# Patient Record
Sex: FEMALE | Race: WHITE
[De-identification: names, ages, dates, MRNs, and addresses within clinical notes are randomized per-mention and may not be internally consistent; named-entity substitution may affect disease eponyms.]

---

## 2017-04-15 ENCOUNTER — HOSPITAL ENCOUNTER (EMERGENCY)
Dept: HOSPITAL 11 - JP.ED | Age: 47
Discharge: HOME | End: 2017-04-15
Payer: MEDICAID

## 2017-04-15 VITALS — DIASTOLIC BLOOD PRESSURE: 80 MMHG | SYSTOLIC BLOOD PRESSURE: 128 MMHG

## 2017-04-15 DIAGNOSIS — N39.0: Primary | ICD-10-CM

## 2017-04-15 DIAGNOSIS — Z79.82: ICD-10-CM

## 2017-04-15 DIAGNOSIS — N23: ICD-10-CM

## 2017-04-15 DIAGNOSIS — Z87.442: ICD-10-CM

## 2017-04-15 DIAGNOSIS — Z88.0: ICD-10-CM

## 2017-04-15 DIAGNOSIS — Z88.2: ICD-10-CM

## 2017-04-15 DIAGNOSIS — Z98.890: ICD-10-CM

## 2017-04-15 DIAGNOSIS — Z79.899: ICD-10-CM

## 2017-04-15 NOTE — EDM.PDOC
[id number]   4d


KIDNEY INFECTION


Time Seen by Provider: 04/15/17 20:45


Source: Reports: Patient


History Limitations: Reports: No limitations





- History of Present Illness


INITIAL COMMENTS - FREE TEXT/NARRATIVE: 





47-year-old female who recently had a right ureteral stent placed for recurring 

nephrolithiasis has been off an antibiotic for the past 5 days.  She started 

developing urinary tract infection symptoms such as discomfort and dysuria the 

last 24 hours.  She is afebrile but says she's been running low-grade fevers at 

home.  Mild nausea but no vomiting.


Quality: Reports: ache, burning


Severity: moderate


Worsens with: Reports: urinating


Associated Symptoms: Reports: dysuria, urgency, fever/chills, other (Flank and 

back pain)





- Related Data


Allergies/ADRs: 


 Allergies











Allergy/AdvReac Type Severity Reaction Status Date / Time


 


Penicillins Allergy  Rash Verified 02/08/17 09:44


 


Sulfa (Sulfonamide Allergy  Rash Verified 02/08/17 09:44





Antibiotics)     











Home Meds: 


 Home Meds





Albuterol Sulfate [Proair Hfa] 2 puff IH Q4HR PRN 06/30/15 [History]


Amitriptyline [Elavil] 20 mg PO DAILY 06/30/15 [History]


Aspirin [Halfprin] 81 mg PO DAILY 06/30/15 [History]


Cholecalciferol (Vitamin D3) [Vitamin D] 1,200 mg PO DAILY 06/30/15 [History]


Fluticasone Propionate [Flonase] 2 sprays NASBOTH DAILY 06/30/15 [History]


Levothyroxine [Synthroid] 100 mcg PO DAILY 06/30/15 [History]


Omeprazole [Prilosec] 20 mg PO DAILY 06/30/15 [History]


Potassium Chloride [Klor-Con M20] 20 meq PO DAILY 06/30/15 [History]


Triamterene/Hydrochlorothiazid [Triamterene-HCTZ 37.5-25 MG] 1 tab PO DAILY 06/

30/15 [History]


Vitamin B Complex 2 tab PO DAILY 06/30/15 [History]


Tamsulosin [Flomax] 0.4 mg PO DAILY 02/22/17 [History]











Past Medical History


Genitourinary History: Reports: Renal calculus


Musculoskeletal History: Reports: Back pain, chronic





- Past Surgical History


Musculoskeletal Surgical History: Reports: Arthroscopic knee





Social & Family History





- Tobacco Use


Smoking Status *Q: Never Smoker


Years of Tobacco use: 15


Second Hand Smoke Exposure: No





- Recreational Drug Use


Recreational Drug Use: No





ED ROS GENERAL





- Review of Systems


Review Of Systems: ROS reveals no pertinent complaints other than HPI.





ED EXAM, RENAL/





- Physical Exam


Exam: See Below


Exam Limited By: No limitations


General Appearance: alert, no apparent distress (The patient is not distressed 

but does appear uncomfortable)


Respiratory/Chest: no respiratory distress


Back Exam: CVA tenderness (R) (Does have some tenderness to palpation of the 

right CVA area)


Neurological: alert, oriented


Psychiatric: anxious


Skin Exam: Warm, Dry





Course





- Vital Signs


Last Recorded V/S: 


 Last Vital Signs











Temp  98.6 F   04/15/17 20:41


 


Pulse  113 H  04/15/17 20:41


 


Resp  14   04/15/17 20:41


 


BP  128/80   04/15/17 20:41


 


Pulse Ox  97   04/15/17 20:41














- Orders/Labs/Meds


Orders: 


 Active Orders 24 hr











 Category Date Time Status


 


 CULTURE URINE [RM] Stat Lab  04/15/17 20:56 Received











Labs: 


 Laboratory Tests











  04/15/17 Range/Units





  20:28 


 


Urine Color  Yellow  


 


Urine Appearance  Clear  


 


Urine pH  6.0  (4.5-8.0)  


 


Ur Specific Gravity  1.015  (1.008-1.030)  


 


Urine Protein  Negative  (NEGATIVE)  mg/dL


 


Urine Glucose (UA)  Normal  (NEGATIVE)  mg/dL


 


Urine Ketones  Negative  (NEGATIVE)  mg/dL


 


Urine Occult Blood  Moderate  (NEGATIVE)  


 


Urine Nitrite  Negative  (NEGATIVE)  


 


Urine Bilirubin  Negative  (NEGATIVE)  


 


Urine Urobilinogen  Normal  (NORMAL)  mg/dL


 


Ur Leukocyte Esterase  Negative  (NEGATIVE)  


 


Urine RBC  Not seen  (0-5)  


 


Urine WBC  5-10 H  (0-5)  


 


Ur Epithelial Cells  Few  


 


Amorphous Sediment  Not seen  


 


Urine Bacteria  Few  


 


Urine Mucus  Not seen  














- Re-Assessments/Exams


Free Text/Narrative Re-Assessment/Exam: 





04/15/17 20:55


A UA was obtained and shows just a few bacteria and wbc's.  A culture was 

initiated and the patient placed on 500 mg of Cipro once daily.  She may just 

be having some ureteral colic from the stent.  If worsening she can return, 

otherwise we will contact her with culture results in 2-3 days.  She is 

scheduled to have the stent removed in 10 days and will have 10 days worth of 

antibiotic.  She was also given 10 hydrocodone to take for extra pain control 

if needed.





Departure





- Departure


Time of Disposition: 21:21


Disposition: Home, Self-Care 01


Condition: good


Clinical Impression: 


 Ureteral colic





UTI (urinary tract infection)


Qualifiers:


 Urinary tract infection type: site unspecified Hematuria presence: without 

hematuria Qualified Code(s): N39.0 - Urinary tract infection, site not specified





Instructions:  Kidney Stones, Easy-to-Read


Referrals: 


Lea Zepeda NP [Primary Care Provider] - 


Forms:  ED Department Discharge


Care Plan Goals: 


Take one dose of antibiotic daily, continue with diclofenac and add stronger 

pain medication if needed.  Return if worsening such as high fever or vomiting.





- My Orders


Last 24 Hours: 


My Active Orders





04/15/17 20:56


CULTURE URINE [RM] Stat 














- Assessment/Plan


Last 24 Hours: 


My Active Orders





04/15/17 20:56


CULTURE URINE [RM] Stat

## 2018-06-05 ENCOUNTER — HOSPITAL ENCOUNTER (EMERGENCY)
Dept: HOSPITAL 11 - JP.ED | Age: 48
Discharge: HOME | End: 2018-06-05
Payer: MEDICAID

## 2018-06-05 VITALS — DIASTOLIC BLOOD PRESSURE: 70 MMHG | SYSTOLIC BLOOD PRESSURE: 153 MMHG

## 2018-06-05 DIAGNOSIS — J01.10: ICD-10-CM

## 2018-06-05 DIAGNOSIS — Z79.82: ICD-10-CM

## 2018-06-05 DIAGNOSIS — Z88.2: ICD-10-CM

## 2018-06-05 DIAGNOSIS — Z88.0: ICD-10-CM

## 2018-06-05 DIAGNOSIS — Z91.040: ICD-10-CM

## 2018-06-05 DIAGNOSIS — J45.21: Primary | ICD-10-CM

## 2018-06-05 DIAGNOSIS — I10: ICD-10-CM

## 2018-06-05 DIAGNOSIS — Z79.899: ICD-10-CM

## 2018-06-05 DIAGNOSIS — Z87.891: ICD-10-CM

## 2018-06-05 NOTE — EDM.PDOC
ED HPI GENERAL MEDICAL PROBLEM





- General


Chief Complaint: Respiratory Problem


Stated Complaint: PNEUMONIA GETTING WORSE


Time Seen by Provider: 06/05/18 14:29


Source of Information: Reports: Patient, RN Notes Reviewed


History Limitations: Reports: No Limitations





- History of Present Illness


INITIAL COMMENTS - FREE TEXT/NARRATIVE: 





48-year-old female presents to the emergency department with the complaint of 

shortness of breath. She was recently evaluated at an urgent care clinic 

diagnosed with sinusitis and pneumonia started on the antibiotic of 

azithromycin. She is completed 4 days of that medication has 1 dose remaining. 

She states she continues to have wheezing still feels short of breath feels 

most of her sinus congestion has improved no nausea vomiting no chest pain no 

fevers





- Related Data


 Allergies











Allergy/AdvReac Type Severity Reaction Status Date / Time


 


latex Allergy  Rash Verified 06/05/18 14:06


 


Penicillins Allergy  Rash Verified 06/05/18 14:06


 


Sulfa (Sulfonamide Allergy  Rash Verified 06/05/18 14:06





Antibiotics)     











Home Meds: 


 Home Meds





Albuterol Sulfate [Proair Hfa] 2 puff IH Q4HR PRN 06/30/15 [History]


Amitriptyline [Elavil] 20 mg PO DAILY 06/30/15 [History]


Aspirin [Halfprin] 81 mg PO DAILY 06/30/15 [History]


Cholecalciferol (Vitamin D3) [Vitamin D] 1,200 mg PO DAILY 06/30/15 [History]


Fluticasone Propionate [Flonase] 2 sprays NASBOTH DAILY 06/30/15 [History]


Levothyroxine [Synthroid] 100 mcg PO DAILY 06/30/15 [History]


Omeprazole [Prilosec] 20 mg PO DAILY 06/30/15 [History]


Triamterene/Hydrochlorothiazid [Triamterene-HCTZ 37.5-25 MG] 1 tab PO DAILY 06/

30/15 [History]


Vitamin B Complex 2 tab PO DAILY 06/30/15 [History]


Albuterol [Proventil Neb Soln] 100 mg NEB Q4HRRT 06/05/18 [History]


Azithromycin 500 mg PO DAILY 06/05/18 [History]


Benzonatate 100 mg PO ASDIRECTED 06/05/18 [History]


Cefdinir [Omnicef] 300 mg PO BID #20 cap 06/05/18 [Rx]


predniSONE [Prednisone] 30 mg PO DAILY 06/05/18 [History]











Past Medical History


Cardiovascular History: Reports: Hypertension


Genitourinary History: Reports: Renal Calculus


Musculoskeletal History: Reports: Back Pain, Chronic





- Past Surgical History


Neurological Surgical History: Reports: Discectomy


Musculoskeletal Surgical History: Reports: Arthroscopic Knee





Social & Family History





- Tobacco Use


Smoking Status *Q: Former Smoker


Used Tobacco, but Quit: Yes


Month/Year Tobacco Last Used: 30 years ago





- Caffeine Use


Caffeine Use: Reports: Coffee, Soda





- Recreational Drug Use


Recreational Drug Use: No





ED ROS GENERAL





- Review of Systems


Review Of Systems: See Below


Constitutional: Denies: Fever, Chills


HEENT: Reports: No Symptoms


Respiratory: Reports: Shortness of Breath, Wheezing, Cough, Sputum


Cardiovascular: Reports: Dyspnea on Exertion


GI/Abdominal: Reports: No Symptoms


: Reports: No Symptoms


Musculoskeletal: Reports: No Symptoms


Skin: Reports: No Symptoms


Neurological: Reports: No Symptoms


Psychiatric: Reports: No Symptoms





ED EXAM, GENERAL





- Physical Exam


Exam: See Below


Free Text/Narrative:: 





General: Female, not in any distress, alert and oriented x3 HEENT: head is 

atraumatic normocephalic, eyes pupils equal round reactive to light, sclera 

clear no conjunctivitis appreciated.  Ears blocked by cerumen bilaterally.  Nose

 no septal deviation, nares are clear, no blood present.  Mouth mucosa is moist 

and pink no erythema or exudate noted in soft palate, tongue is midline uvula 

is midline, dentition is intact.


Neck: Supple no thyromegaly no tracheal deviation.


Nodes: Cervical nodes subclavicular nodes nontender no palpable lymphadenopathy 

noted.


Lungs: Breath sounds are decreased with rhonchi in the bases and asked to a 

wheeze mid to lower lung fields bilaterally


CV: Regular rate and rhythm S1 and S2 appreciated no murmurs rubs or gallops 

noted.


Abdomen: Soft, nontender, no palpable masses or organomegaly appreciated, no 

distention no guarding bowel sounds are present, 





Course





- Vital Signs


Last Recorded V/S: 


 Last Vital Signs











Temp  97.9 F   06/05/18 14:02


 


Pulse  88   06/05/18 15:08


 


Resp  16   06/05/18 15:08


 


BP  153/70 H  06/05/18 15:08


 


Pulse Ox  93 L  06/05/18 15:08














- Orders/Labs/Meds


Orders: 


 Active Orders 24 hr











 Category Date Time Status


 


 RT Aerosol Therapy [RC] ASDIRECTED Care  06/05/18 14:35 Active


 


 UA W/MICROSCOPIC [URIN] Urgent Lab  06/05/18 14:49 Ordered











Labs: 


 Laboratory Tests











  06/05/18 06/05/18 06/05/18 Range/Units





  14:37 14:48 14:48 


 


WBC    14.7 H  (4.5-11.0)  K/uL


 


RBC    5.21  (3.30-5.50)  M/uL


 


Hgb    16.8 H  (12.0-15.0)  g/dL


 


Hct    48.5 H  (36.0-48.0)  %


 


MCV    93  (80-98)  fL


 


MCH    32 H  (27-31)  pg


 


MCHC    35  (32-36)  %


 


Plt Count    285  (150-400)  K/uL


 


Neut % (Auto)    70 H  (36-66)  %


 


Lymph % (Auto)    21 L  (24-44)  %


 


Mono % (Auto)    9 H  (2-6)  %


 


Eos % (Auto)    0 L  (2-4)  %


 


Baso % (Auto)    0  (0-1)  %


 


Sodium   139 L   (140-148)  mmol/L


 


Potassium   3.2 L   (3.6-5.2)  mmol/L


 


Chloride   101   (100-108)  mmol/L


 


Carbon Dioxide   30   (21-32)  mmol/L


 


Anion Gap   11.2   (5.0-14.0)  mmol/L


 


BUN   17   (7-18)  mg/dL


 


Creatinine   0.8   (0.6-1.0)  mg/dL


 


Est Cr Clr Drug Dosing   78.76   mL/min


 


Estimated GFR (MDRD)   > 60   (>60)  


 


Glucose   99   ()  mg/dL


 


Lactic Acid     (0.4-2.0)  mmol/L


 


Calcium   8.7   (8.5-10.1)  mg/dL


 


Troponin I  < 0.017    (0.000-0.056)  ng/mL


 


Urine Color     


 


Urine Appearance     


 


Urine pH     (4.5-8.0)  


 


Ur Specific Gravity     (1.008-1.030)  


 


Urine Protein     (NEGATIVE)  mg/dL


 


Urine Glucose (UA)     (NEGATIVE)  mg/dL


 


Urine Ketones     (NEGATIVE)  mg/dL


 


Urine Occult Blood     (NEGATIVE)  


 


Urine Nitrite     (NEGATIVE)  


 


Urine Bilirubin     (NEGATIVE)  


 


Urine Urobilinogen     (NORMAL)  mg/dL


 


Ur Leukocyte Esterase     (NEGATIVE)  


 


Urine RBC     (0-5)  


 


Urine WBC     (0-5)  


 


Ur Epithelial Cells     


 


Amorphous Sediment     


 


Urine Bacteria     


 


Urine Mucus     














  06/05/18 06/05/18 Range/Units





  14:48 14:49 


 


WBC    (4.5-11.0)  K/uL


 


RBC    (3.30-5.50)  M/uL


 


Hgb    (12.0-15.0)  g/dL


 


Hct    (36.0-48.0)  %


 


MCV    (80-98)  fL


 


MCH    (27-31)  pg


 


MCHC    (32-36)  %


 


Plt Count    (150-400)  K/uL


 


Neut % (Auto)    (36-66)  %


 


Lymph % (Auto)    (24-44)  %


 


Mono % (Auto)    (2-6)  %


 


Eos % (Auto)    (2-4)  %


 


Baso % (Auto)    (0-1)  %


 


Sodium    (140-148)  mmol/L


 


Potassium    (3.6-5.2)  mmol/L


 


Chloride    (100-108)  mmol/L


 


Carbon Dioxide    (21-32)  mmol/L


 


Anion Gap    (5.0-14.0)  mmol/L


 


BUN    (7-18)  mg/dL


 


Creatinine    (0.6-1.0)  mg/dL


 


Est Cr Clr Drug Dosing    mL/min


 


Estimated GFR (MDRD)    (>60)  


 


Glucose    ()  mg/dL


 


Lactic Acid  1.3   (0.4-2.0)  mmol/L


 


Calcium    (8.5-10.1)  mg/dL


 


Troponin I    (0.000-0.056)  ng/mL


 


Urine Color   Yellow  


 


Urine Appearance   Clear  


 


Urine pH   6.0  (4.5-8.0)  


 


Ur Specific Gravity   1.015  (1.008-1.030)  


 


Urine Protein   Negative  (NEGATIVE)  mg/dL


 


Urine Glucose (UA)   Normal  (NEGATIVE)  mg/dL


 


Urine Ketones   Negative  (NEGATIVE)  mg/dL


 


Urine Occult Blood   Moderate  (NEGATIVE)  


 


Urine Nitrite   Negative  (NEGATIVE)  


 


Urine Bilirubin   Small  (NEGATIVE)  


 


Urine Urobilinogen   1  (NORMAL)  mg/dL


 


Ur Leukocyte Esterase   Negative  (NEGATIVE)  


 


Urine RBC   5-10 H  (0-5)  


 


Urine WBC   0-5  (0-5)  


 


Ur Epithelial Cells   Few  


 


Amorphous Sediment   Not seen  


 


Urine Bacteria   Rare  


 


Urine Mucus   Not seen  











Meds: 


Medications














Discontinued Medications














Generic Name Dose Route Start Last Admin





  Trade Name Freq  PRN Reason Stop Dose Admin


 


Albuterol/Ipratropium  3 ml  06/05/18 14:35  06/05/18 14:49





  Duoneb 3.0-0.5 Mg/3 Ml  NEB  06/05/18 14:36  3 ml





  ONETIME ONE   Administration





     





     





     





     














Departure





- Departure


Time of Disposition: 15:39


Disposition: Home, Self-Care 01


Condition: Good


Clinical Impression: 


Exacerbation of asthma


Qualifiers:


 Asthma severity: mild Asthma persistence: intermittent Qualified Code(s): 

J45.21 - Mild intermittent asthma with (acute) exacerbation





Sinusitis


Qualifiers:


 Sinusitis location: frontal Chronicity: acute Recurrence: non-recurrent 

Qualified Code(s): J01.10 - Acute frontal sinusitis, unspecified








- Discharge Information


Prescriptions: 


Cefdinir [Omnicef] 300 mg PO BID #20 cap


Referrals: 


Nico Corona MD [Primary Care Provider] - 


Forms:  ED Department Discharge


Additional Instructions: 


Take full course of antibiotics, continue to use your albuterol inhaler as 

needed for shortness of breath symptoms, please follow-up with primary care in 

the next 3-5 days for reevaluation, call or return to the emergency department 

worsening of symptoms





- My Orders


Last 24 Hours: 


My Active Orders





06/05/18 14:35


RT Aerosol Therapy [RC] ASDIRECTED 





06/05/18 14:49


UA W/MICROSCOPIC [URIN] Urgent 














- Assessment/Plan


Last 24 Hours: 


My Active Orders





06/05/18 14:35


RT Aerosol Therapy [RC] ASDIRECTED 





06/05/18 14:49


UA W/MICROSCOPIC [URIN] Urgent 











Plan: 





Assessment





Acuity = acute





Site and laterality = sinusitis complicated with asthma exacerbation  





Etiology  = probable bacterial cause  





Manifestations = dyspnea  





Location of injury =  Home





Lab values = WBC elevated at 14.7 consistent leukocytosis, potassium low at 3.2 

consistent with hypokalemia, troponin was negative, lactic acid normal 1.3 

urinalysis reveals 5-10 rbc's consistent hematuria, chest x-ray shows no acute 

process  





Plan


I did review lab work and chest x-ray results with her she had good improvement 

in her respiration with the DuoNeb treatment. One of her concerns is that the 

azithromycin is not helping her sinusitis would like to change in antibiotic 

therefore prescription written for Ceftin are 300 mg by mouth twice a day 10 

days, she does have an albuterol inhaler at home which she is not been using 

she will start using that as needed for wheezing symptoms cough and shortness 

of breath, she will follow-up with her primary care provider in the next 3-5 

days for reevaluation  

















 This note was dictated using dragon voice recognition software please call 

with any questions on syntax or grammar.

## 2018-06-05 NOTE — CR
CHEST: 2 view

 

CLINICAL HISTORY: Shortness of breath

 

COMPARISON:2013

 

FINDINGS:  Heart size, pulmonary vascularity and hilar structures are normal. No infiltrate effusion 
or pneumothorax is seen..

 

 

IMPRESSION:  No acute cardiothoracic process or significant change from prior study

## 2019-03-06 ENCOUNTER — HOSPITAL ENCOUNTER (EMERGENCY)
Dept: HOSPITAL 11 - JP.ED | Age: 49
Discharge: HOME | End: 2019-03-06
Payer: MEDICAID

## 2019-03-06 VITALS — DIASTOLIC BLOOD PRESSURE: 86 MMHG | SYSTOLIC BLOOD PRESSURE: 152 MMHG

## 2019-03-06 DIAGNOSIS — Z88.0: ICD-10-CM

## 2019-03-06 DIAGNOSIS — Z88.2: ICD-10-CM

## 2019-03-06 DIAGNOSIS — Z79.82: ICD-10-CM

## 2019-03-06 DIAGNOSIS — Z79.899: ICD-10-CM

## 2019-03-06 DIAGNOSIS — E03.9: ICD-10-CM

## 2019-03-06 DIAGNOSIS — Z87.891: ICD-10-CM

## 2019-03-06 DIAGNOSIS — Z91.040: ICD-10-CM

## 2019-03-06 DIAGNOSIS — K21.9: Primary | ICD-10-CM

## 2019-03-06 PROCEDURE — 36415 COLL VENOUS BLD VENIPUNCTURE: CPT

## 2019-03-06 PROCEDURE — 85025 COMPLETE CBC W/AUTO DIFF WBC: CPT

## 2019-03-06 PROCEDURE — 99284 EMERGENCY DEPT VISIT MOD MDM: CPT

## 2019-03-06 PROCEDURE — 80053 COMPREHEN METABOLIC PANEL: CPT

## 2019-03-06 PROCEDURE — 84484 ASSAY OF TROPONIN QUANT: CPT

## 2019-03-06 PROCEDURE — 93005 ELECTROCARDIOGRAM TRACING: CPT

## 2019-03-06 NOTE — EDM.PDOC
ED HPI GENERAL MEDICAL PROBLEM





- General


Chief Complaint: Abdominal Pain


Stated Complaint: ABD PAIN


Time Seen by Provider: 03/06/19 10:55


Source of Information: Reports: Patient, RN Notes Reviewed


History Limitations: Reports: No Limitations





- History of Present Illness


INITIAL COMMENTS - FREE TEXT/NARRATIVE: 





49-year-old female presents emergency department day complaint of epigastric 

pain, she states it started around midnight worse when she lays down and worse 

when she stands straight up she gets some relief when she sits at about a 45 

angle. Does have severe nausea and vomiting with this no shortness of breath no 

chest pain does of a history of reflux feels very similar does take a 

medication only intermittently


  ** Upper Abdominal


Pain Score (Numeric/FACES): 8





- Related Data


 Allergies











Allergy/AdvReac Type Severity Reaction Status Date / Time


 


latex Allergy  Rash Verified 03/06/19 10:31


 


Penicillins Allergy  Rash Verified 03/06/19 10:31


 


Sulfa (Sulfonamide Allergy  Rash Verified 03/06/19 10:31





Antibiotics)     











Home Meds: 


 Home Meds





Albuterol Sulfate [Proair Hfa] 2 puff IH Q4HR PRN 06/30/15 [History]


Amitriptyline [Elavil] 20 mg PO DAILY 06/30/15 [History]


Aspirin [Halfprin] 81 mg PO DAILY 06/30/15 [History]


Cholecalciferol (Vitamin D3) [Vitamin D] 1,200 mg PO DAILY 06/30/15 [History]


Fluticasone Propionate [Flonase] 2 sprays NASBOTH DAILY 06/30/15 [History]


Levothyroxine [Synthroid] 100 mcg PO DAILY 06/30/15 [History]


Omeprazole [Prilosec] 20 mg PO DAILY 06/30/15 [History]


Triamterene/Hydrochlorothiazid [Triamterene-HCTZ 37.5-25 MG] 1 tab PO DAILY 06/

30/15 [History]


Vitamin B Complex 2 tab PO DAILY 06/30/15 [History]


Albuterol [Proventil Neb Soln] 100 mg NEB Q4HRRT 06/05/18 [History]


Benzonatate 100 mg PO ASDIRECTED 06/05/18 [History]


rOPINIRole [Requip] 0.25 mg PO BEDTIME 01/09/19 [History]


Amoxicillin 875 mg PO BID 03/06/19 [History]











Past Medical History


Cardiovascular History: Reports: Hypertension


Respiratory History: Reports: Asthma


Gastrointestinal History: Reports: GERD


Genitourinary History: Reports: Renal Calculus


OB/GYN History: Reports: Pregnancy


Musculoskeletal History: Reports: Back Pain, Chronic, Fibromyalgia


Endocrine/Metabolic History: Reports: Hypothyroidism, Obesity/BMI 30+


Hematologic History: Reports: Iron Deficiency





- Infectious Disease History


Infectious Disease History: Reports: Chicken Pox, Measles





- Past Surgical History


Head Surgeries/Procedures: Reports: None


Cardiovascular Surgical History: Reports: None


Respiratory Surgical History: Reports: None


GI Surgical History: Reports: None


Female  Surgical History: Reports: None


Endocrine Surgical History: Reports: None


Neurological Surgical History: Reports: Discectomy


Musculoskeletal Surgical History: Reports: Arthroscopic Knee, Other (See Below)


Other Musculoskeletal Surgeries/Procedures:: bilat knee surgery


Dermatological Surgical History: Reports: None





Social & Family History





- Tobacco Use


Smoking Status *Q: Former Smoker


Used Tobacco, but Quit: Yes


Month/Year Tobacco Last Used: 1984


Second Hand Smoke Exposure: No





- Caffeine Use


Caffeine Use: Reports: Soda





- Recreational Drug Use


Recreational Drug Use: No





ED ROS GENERAL





- Review of Systems


Review Of Systems: See Below


Constitutional: Reports: No Symptoms


HEENT: Reports: No Symptoms


Respiratory: Reports: No Symptoms


Cardiovascular: Reports: No Symptoms


GI/Abdominal: Reports: Abdominal Pain (Epigastric area), Nausea, Vomiting


: Reports: No Symptoms


Musculoskeletal: Reports: No Symptoms


Skin: Reports: No Symptoms


Neurological: Reports: No Symptoms





ED EXAM, GI/ABD





- Physical Exam


Exam: See Below


Exam Limited By: No Limitations


General Appearance: Alert, WD/WN, No Apparent Distress


Respiratory/Chest: No Respiratory Distress, Lungs Clear, Normal Breath Sounds, 

No Accessory Muscle Use, Chest Non-Tender


Cardiovascular: Regular Rate, Rhythm, No Murmur


GI/Abdominal Exam: Normal Bowel Sounds, Soft, No Organomegaly, No Distention, 

Tender (Epigastric region)


Back Exam: No: CVA Tenderness (R), CVA Tenderness (L)





Course





- Vital Signs


Last Recorded V/S: 


 Last Vital Signs











Temp  97.8 F   03/06/19 10:36


 


Pulse  91   03/06/19 10:36


 


Resp  16   03/06/19 10:36


 


BP  152/86 H  03/06/19 10:36


 


Pulse Ox  93 L  03/06/19 10:36














- Orders/Labs/Meds


Orders: 


 Active Orders 24 hr











 Category Date Time Status


 


 Cardiac Monitoring [RC] .As Directed Care  03/06/19 10:59 Active


 


 EKG Documentation Completion [RC] ASDIRECTED Care  03/06/19 11:00 Active


 


 EKG 12 Lead [EK] Stat Ther  03/06/19 11:00 Ordered











Labs: 


 Laboratory Tests











  03/06/19 03/06/19 Range/Units





  11:18 11:18 


 


WBC  9.3   (4.5-11.0)  K/uL


 


RBC  5.25   (3.30-5.50)  M/uL


 


Hgb  16.8 H   (12.0-15.0)  g/dL


 


Hct  49.0 H   (36.0-48.0)  %


 


MCV  93   (80-98)  fL


 


MCH  32 H   (27-31)  pg


 


MCHC  34   (32-36)  %


 


Plt Count  283   (150-400)  K/uL


 


Neut % (Auto)  66   (36-66)  %


 


Lymph % (Auto)  20 L   (24-44)  %


 


Mono % (Auto)  7 H   (2-6)  %


 


Eos % (Auto)  5 H   (2-4)  %


 


Baso % (Auto)  1   (0-1)  %


 


Sodium   142  (140-148)  mmol/L


 


Potassium   3.6  (3.6-5.2)  mmol/L


 


Chloride   104  (100-108)  mmol/L


 


Carbon Dioxide   31  (21-32)  mmol/L


 


Anion Gap   6.9  (5.0-14.0)  mmol/L


 


BUN   21 H  (7-18)  mg/dL


 


Creatinine   0.8  (0.6-1.0)  mg/dL


 


Est Cr Clr Drug Dosing   77.88  mL/min


 


Estimated GFR (MDRD)   > 60  (>60)  


 


Glucose   97  ()  mg/dL


 


Calcium   9.5  (8.5-10.1)  mg/dL


 


Total Bilirubin   0.3  (0.2-1.0)  mg/dL


 


AST   14 L  (15-37)  U/L


 


ALT   30  (12-78)  U/L


 


Alkaline Phosphatase   88  ()  U/L


 


Troponin I   < 0.017  (0.000-0.056)  ng/mL


 


Total Protein   6.9  (6.4-8.2)  g/dL


 


Albumin   3.4  (3.4-5.0)  g/dL


 


Globulin   3.5  (2.3-3.5)  g/dL


 


Albumin/Globulin Ratio   1.0 L  (1.2-2.2)  











Meds: 


Medications














Discontinued Medications














Generic Name Dose Route Start Last Admin





  Trade Name Flex  PRN Reason Stop Dose Admin


 


Al Hydroxide/Mg Hydroxide 15  0 ml  03/06/19 11:00  03/06/19 11:11





  ml/ Lidocaine HCl 15 ml  PO  03/06/19 11:01  15 ml





  ONETIME ONE   Administration





     





     





     





     


 


Ondansetron HCl  4 mg  03/06/19 11:00  03/06/19 11:10





  Zofran Odt  PO  03/06/19 11:01  4 mg





  ONETIME ONE   Administration





     





     





     





     


 


Ranitidine HCl  150 mg  03/06/19 11:49  03/06/19 12:08





  Zantac  PO  03/06/19 11:50  150 mg





  NOW STA   Administration





     





     





     





     














Departure





- Departure


Time of Disposition: 12:51


Disposition: Home, Self-Care 01


Condition: Fair


Clinical Impression: 


GERD (gastroesophageal reflux disease)


Qualifiers:


 Esophagitis presence: esophagitis presence not specified Qualified Code(s): 

K21.9 - Gastro-esophageal reflux disease without esophagitis








- Discharge Information


Referrals: 


Norma Hodgson PA-C [Primary Care Provider] - 


Forms:  ED Department Discharge


Additional Instructions: 


Increase your omeprazole to twice a day dosing, recommend the next 6-8 weeks, 

please follow-up with your primary care in the next 3-5 days for reevaluation, 

call return to the emergency department worsening of symptoms





- My Orders


Last 24 Hours: 


My Active Orders





03/06/19 10:59


Cardiac Monitoring [RC] .As Directed 





03/06/19 11:00


EKG Documentation Completion [RC] ASDIRECTED 


EKG 12 Lead [EK] Stat 














- Assessment/Plan


Last 24 Hours: 


My Active Orders





03/06/19 10:59


Cardiac Monitoring [RC] .As Directed 





03/06/19 11:00


EKG Documentation Completion [RC] ASDIRECTED 


EKG 12 Lead [EK] Stat 











Plan: 





Assessment





Acuity = acute





Site and laterality = gastroesophageal reflux disease  





Etiology  = unknown etiology  





Manifestations = epigastric pain  





Location of injury =  Home





Lab values = CBC, CMP, troponin unremarkable EKG demonstrates sinus sinus 

rhythm on appreciate any ST elevations or depressions 





Plan


[She had good improvement combination GI cocktail and Zantac she was currently 

using her omeprazole on a when necessary basis recommended twice a day dosing 

for the next 6-8 weeks and then reduction of the medication, recommend follow-

up with primary care in the next 3-5 days for reevaluation

















 This note was dictated using dragon voice recognition software please call 

with any questions on syntax or grammar.

## 2019-03-21 ENCOUNTER — HOSPITAL ENCOUNTER (OUTPATIENT)
Dept: HOSPITAL 11 - JP.SDS | Age: 49
Discharge: HOME | End: 2019-03-21
Attending: SURGERY
Payer: MEDICAID

## 2019-03-21 VITALS — SYSTOLIC BLOOD PRESSURE: 123 MMHG | DIASTOLIC BLOOD PRESSURE: 82 MMHG

## 2019-03-21 DIAGNOSIS — K21.9: ICD-10-CM

## 2019-03-21 DIAGNOSIS — K25.9: Primary | ICD-10-CM

## 2019-03-21 PROCEDURE — 43239 EGD BIOPSY SINGLE/MULTIPLE: CPT

## 2019-03-21 PROCEDURE — 87081 CULTURE SCREEN ONLY: CPT

## 2019-03-21 PROCEDURE — 45378 DIAGNOSTIC COLONOSCOPY: CPT

## 2019-03-28 NOTE — OR
DATE OF PROCEDURE:  03/21/2019

 

PREOPERATIVE DIAGNOSES:

1. History of nausea, abdominal pain, and emesis.

2. Indication for screening colonoscopy.

 

POSTOPERATIVE DIAGNOSES:

1. Healing erosions in pre-pyloric stomach.

2. Normal screening colonoscopy.

 

OPERATIVE PROCEDURES:

1. Esophagogastroduodenoscopy with biopsies of antrum for CLOtest.

2. Flexible colonoscopy.

 

ANESTHESIA:  IV sedation.

 

INDICATION FOR PROCEDURE:  A 49-year-old female presenting with some nausea, upper abdominal

discomfort, and intermittent vomiting.  She had been started on omeprazole and Carafate

recently and thinks that things may be improving somewhat in terms of her upper GI symptoms.

She is also being treated for screening colonoscopy and will undergo that, along with

polypectomy and/or biopsies as indicated.  Potential risks of the procedures including

bleeding and perforation were discussed, and the patient wishes to proceed.

 

DETAILS OF PROCEDURE:  The patient was taken to the operating room and placed in a left

lateral decubitus position.  IV sedation was administered, after which the upper GI

endoscope was passed orally through the length of the esophagus through the stomach, with

retroflexion view of the fundus, and thereafter through the pyloric channel to the junction

of the third and fourth portions of the duodenum.

 

Findings included normal hypopharynx, larynx, upper esophageal sphincter, and esophageal

body.  EG junction showed no significant hiatal hernia or inflammation.  Within the stomach,

small amount of retained bile was present.  There were some healing erosions in the

prepyloric area, and these were covered with fibrinous-type exudate.  No blood or bleeding

was seen.  The prepyloric channel and visualized portions of the duodenum were unremarkable.

At this point, biopsies were obtained from the antrum and sent for CLOtest for H. pylori.

Minimal bleeding from the biopsy sites was seen, and the gastroscope then withdrawn.

 

The initial digital rectal exam was then performed and colonoscope inserted with

retroflexion revealing uncomplicated hemorrhoidal columns.  The scope was eventually passed

to the cecum.  The prep was fairly good with there only being a small amount of solid and

liquid stool present, but the vast majority of the mucosal surfaces were well seen.  To the

level of the cecum, no abnormalities were noted, specifically no areas of diverticular

disease, no polyps or other signs of neoplasia, and no areas of colitis.  The scope was then

withdrawn, the above findings were reconfirmed, and the procedure then concluded.

 

The plan will be to have the patient follow up with her provider, Norma Hodgson PA-C, in

[facility] in 1 to 2 weeks.  She will be instructed to continue the omeprazole and

Carafate.  Otherwise, from a colonoscopy standpoint, she has no personal or family history

of colonic neoplasia, so the next colonoscopy should likely be scheduled around 10 years.

 

 

 

 

Nick Enciso MD

DD:  03/27/2019 08:11:14

DT:  03/27/2019 14:40:29

Job #:  [id number]

## 2019-04-10 NOTE — NM
HEPATOBILIARY SCAN WITH EJECTION FRACTION:

 

Clinical History: Epigastric pain

 

 Multiple images of the hepatobiliary system were obtained following the IV

injection of 4.95 mCi of technetium 99m Choletec. 60 minutes into the study the

patient was given1.59 mcg of CCK by slow IV push. The patient experienced right

upper quadrant pain and nausea upon injection]Quantitative analysis of the

gallbladder was performed.

 

Findings: There is a normal pattern of hepatic uptake. Biliary activity is seen

at 5minutes. Gallbladder activity is seen at 60minutes. Intestinal activity is

seen at 10minutes.

Following the Kinevac infusion the gallbladder ejection fraction was calculated

at 92%.

 

Impression:Normal hepatobiliary scan 

The gallbladder ejection fraction wasnormal at 92%

## 2019-06-19 NOTE — ANES
DATE OF SERVICE:  06/19/2019

 

INDICATION:  Cande is a 49-year-old female patient referred to us by Dr. Torres for

epidural steroid injection and trigger point injections today.  She has had both of the

procedures in the past, is well aware of the risks and benefits related to both procedures,

and wishes to proceed with both the trigger point injection and epidural steroid injection

today.  Please refer to the doctor's notes for ICD-10 code and diagnosis.

 

TECHNIQUE:  The patient was then sat at the edge of bed.  Betadine prep x3 to the lumbar

region was done.  Sterile drape was placed.  1% lidocaine skin wheal and deep was done.  A

17-gauge Tuohy needle was inserted at approximately the L4-5 position just above her back

scar.  Loss of resistance was achieved.  Negative paresthesia, negative heme, and negative

CSF were noted.  I then proceeded to give 7 mL of sterile normal saline with 2 mL of 0.25%

Sensorcaine and 1 mL of 80 mg Depo-Medrol.  Tuohy needle was then flushed and withdrawn.

Sterile drape was taken down.  Betadine was cleaned off her back and a Band-Aid was applied

to the puncture site for hemostasis.  I then turned my attention to the trigger points.  The

patient was wanting me to focus on her upper back, bilateral trapezius muscles, and then the

upper part of her back between her scapula.  I was able to easily identify approximately 14

to 15 trigger points in that area.  Alcohol was used to clean the skin prior to the

injections.  1 to 2 mL of 0.5% Sensorcaine was injected into those areas.  More than 3

muscle groups were injected today.  The patient tolerated both procedures without

difficulty.  Please refer to the nurse's notes for vital signs.  After the appropriate

amount of time, the patient will be discharged per [facility] protocol.

 

 

 

 

Howard Banerjee CRNA

DD:  06/19/2019 11:41:57

DT:  06/19/2019 18:02:19

Job #:  [id number]